# Patient Record
Sex: MALE | Race: WHITE
[De-identification: names, ages, dates, MRNs, and addresses within clinical notes are randomized per-mention and may not be internally consistent; named-entity substitution may affect disease eponyms.]

---

## 2017-01-26 ENCOUNTER — HOSPITAL ENCOUNTER (OUTPATIENT)
Dept: HOSPITAL 35 - CV | Age: 82
End: 2017-01-26
Attending: INTERNAL MEDICINE
Payer: COMMERCIAL

## 2017-01-26 DIAGNOSIS — E11.9: ICD-10-CM

## 2017-01-26 DIAGNOSIS — I25.10: Primary | ICD-10-CM

## 2017-01-26 DIAGNOSIS — E78.5: ICD-10-CM

## 2017-01-26 DIAGNOSIS — I10: ICD-10-CM

## 2018-01-25 ENCOUNTER — HOSPITAL ENCOUNTER (OUTPATIENT)
Dept: HOSPITAL 35 - NUC | Age: 83
End: 2018-01-25
Attending: INTERNAL MEDICINE
Payer: COMMERCIAL

## 2018-01-25 DIAGNOSIS — I25.10: Primary | ICD-10-CM

## 2018-01-25 DIAGNOSIS — E11.9: ICD-10-CM

## 2018-02-05 ENCOUNTER — HOSPITAL ENCOUNTER (OUTPATIENT)
Dept: HOSPITAL 61 - PCVCCLINIC | Age: 83
Discharge: HOME | End: 2018-02-05
Attending: INTERNAL MEDICINE
Payer: MEDICARE

## 2018-02-05 DIAGNOSIS — I25.10: ICD-10-CM

## 2018-02-05 DIAGNOSIS — I10: Primary | ICD-10-CM

## 2018-02-05 PROCEDURE — 36415 COLL VENOUS BLD VENIPUNCTURE: CPT

## 2019-01-31 ENCOUNTER — HOSPITAL ENCOUNTER (OUTPATIENT)
Dept: HOSPITAL 35 - CV | Age: 84
End: 2019-01-31
Attending: INTERNAL MEDICINE
Payer: COMMERCIAL

## 2019-01-31 DIAGNOSIS — I10: ICD-10-CM

## 2019-01-31 DIAGNOSIS — I25.10: Primary | ICD-10-CM

## 2019-01-31 DIAGNOSIS — E11.9: ICD-10-CM

## 2019-01-31 DIAGNOSIS — I07.1: ICD-10-CM

## 2019-01-31 DIAGNOSIS — E78.5: ICD-10-CM

## 2019-01-31 NOTE — 2DMMODE
[facility]
[employer]
[place]  [address]
Phone:  [phone] 2 D/M-MODE ECHOCARDIOGRAM     
_______________________________________________________________________________
 
Name:            DILMA KING               Room #:                    REG CL
[facility]#:           [id number]          Account #:     [id number]  
Admission:       19         Attend Phys:   Pro Quintana MD   
Discharge:                  Date of Birth: [birth year]  
Date of Service: 19 1144               Report #:      [id number]
        [id number]
_______________________________________________________________________________
THIS REPORT FOR:   //name//                          
 
 
--------------- APPROVED REPORT --------------
 
 
Study performed:  2019 11:10:59
 
EXAM: Comprehensive 2D, Doppler, and color-flow Echocardiogram 
Patient Location: Out-Patient   
      Status:  routine
 
        BSA:         2.13
HR: 110 bpm   BP:          136/60 mmHg 
Rhythm: Atrial Fibrillation     
 
Other Information 
Study Quality: Adequate
 
Indications
CAD
Hx: DM, HTN, HLP
 
2D Dimensions
RVDd:  42.36 mm  
IVSd:  12.00 (7-11mm) LVOT Diam:  23.52 (18-24mm) 
LVDd:  42.00 mm  
PWd:  12.00 (7-11mm) Ascending Ao:  33.65 (22-36mm)
LVDs:  28.00 (25-40mm) 
Aortic Root:  36.18 mm 
 
Volumes
Left Atrial Volume (Systole) 
Single Plane 4CH:  61.85 mL Single Plane 2CH:  66.93 mL
    LA ESV Index:  33.00 mL/m2
 
Aortic Valve
AoV Peak Peter.:  1.46 m/s 
AO Peak Gr.:  8.64 mmHg  LVOT Max P.21 mmHg
    LVOT Max V:  1.02 m/s
ALEM Vmax: 3.04 cm2  
 
Mitral Valve
    MV Decel. Time:  175.27 ms
MV E Max Peter.:  1.03 m/s 
 
 
 
[facility]
[address]
[place]  [address]
Phone:  [phone]                    2 D/M-MODE ECHOCARDIOGRAM     
_______________________________________________________________________________
 
Name:            DILMA KING               Room #:                    REG CL
[facility]#:           [id number]          Account #:     [id number]  
Admission:       19         Attend Phys:   Pro Quintana MD   
Discharge:                  Date of Birth: [birth year]  
Date of Service: 19 1144               Report #:      [id number]
        [id number]
_______________________________________________________________________________
Pulmonary Valve
PV Peak Peter.:  1.14 m/s PV Peak Gr.:  5.28 mmHg
 
Tricuspid Valve
TR Peak Peter.:  2.76 m/s  RAP Estimate:  5.00 mmHg
TR Peak Gr.:  30.59 mmHg 
 
Left Ventricle
The left ventricle is normal size. There is normal LV segmental wall motion.
Mild 
concentric left ventricular hypertrophy. Left ventricular systolic function is
normal. 
LVEF is 60-65%. This study is not technically sufficient to allow evaluation of
the LV 
diastolic function due to atrial fibrillation.
 
Right Ventricle
The right ventricle is normal size. The right ventricular systolic function is 
normal.
 
Atria
Left atrium is at the upper limits of normal. The right atrium size is normal.
 
Aortic Valve
The aortic valve is normal in structure. No aortic regurgitation is present.
There is no 
aortic valvular stenosis.
 
Mitral Valve
The mitral valve is normal in structure. Trace mitral regurgitation.
 
Tricuspid Valve
The tricuspid valve is normal in structure. Mild to moderate tricuspid
regurgitation. 
Estimated PAP of 35-40mmHg.
 
Pulmonic Valve
The pulmonary valve is normal in structure. Trace pulmonic regurgitation.
 
Great Vessels
The aortic root is normal in size. The ascending aorta is normal in size. IVC is
normal 
in size and collapses >50% with inspiration.
 
Pericardium
There is no pericardial effusion.
 
 
[facility]
[address]
[place]  [address]
Phone:  [phone]                    2 D/M-MODE ECHOCARDIOGRAM     
_______________________________________________________________________________
 
Name:            DILMA KING               [id number]          Account #:     [id number]  
Admission:       19         Attend Phys:   Pro Quintana MD   
Discharge:                  Date of Birth: [birth year]  
Date of Service: 19 1144               Report #:      [id number]
        [id number]
_______________________________________________________________________________
 
<Conclusion>
The left ventricle is normal size.
Mild concentric left ventricular hypertrophy.
Left ventricular systolic function is normal.
The right ventricle is normal size.
Left atrium is at the upper limits of normal.
The aortic valve is normal in structure.
Trace mitral regurgitation.
Mild to moderate tricuspid regurgitation. Estimated PAP of 35-40mmHg.
 
 
 
 
 
 
 
 
 
 
 
 
 
 
 
 
 
 
 
 
 
 
 
 
 
 
 
 
 
 
 
 
 
 
  <ELECTRONICALLY SIGNED>
   By: Pro Quintana MD               
  19     1144
D: 19 1144                           _____________________________________
T: 19 1144                           Pro Quintana MD                 /INF

## 2019-02-19 ENCOUNTER — HOSPITAL ENCOUNTER (OUTPATIENT)
Dept: HOSPITAL 61 - PCVCCLINIC | Age: 84
Discharge: HOME | End: 2019-02-19
Attending: INTERNAL MEDICINE
Payer: MEDICARE

## 2019-02-19 DIAGNOSIS — Z79.4: ICD-10-CM

## 2019-02-19 DIAGNOSIS — E78.00: ICD-10-CM

## 2019-02-19 DIAGNOSIS — Z79.82: ICD-10-CM

## 2019-02-19 DIAGNOSIS — I10: ICD-10-CM

## 2019-02-19 DIAGNOSIS — I25.10: Primary | ICD-10-CM

## 2019-02-19 DIAGNOSIS — E11.9: ICD-10-CM

## 2019-02-19 PROCEDURE — 93005 ELECTROCARDIOGRAM TRACING: CPT

## 2019-02-19 PROCEDURE — G0463 HOSPITAL OUTPT CLINIC VISIT: HCPCS

## 2019-08-30 ENCOUNTER — HOSPITAL ENCOUNTER (OUTPATIENT)
Dept: HOSPITAL 61 - PCVCIMAG | Age: 84
Discharge: HOME | End: 2019-08-30
Attending: INTERNAL MEDICINE
Payer: MEDICARE

## 2019-08-30 DIAGNOSIS — E78.00: ICD-10-CM

## 2019-08-30 DIAGNOSIS — I08.1: Primary | ICD-10-CM

## 2019-08-30 DIAGNOSIS — Z79.82: ICD-10-CM

## 2019-08-30 DIAGNOSIS — I10: ICD-10-CM

## 2019-08-30 DIAGNOSIS — I25.10: ICD-10-CM

## 2019-08-30 DIAGNOSIS — E11.9: ICD-10-CM

## 2019-08-30 DIAGNOSIS — N28.9: ICD-10-CM

## 2019-08-30 PROCEDURE — 93005 ELECTROCARDIOGRAM TRACING: CPT

## 2019-08-30 PROCEDURE — G0463 HOSPITAL OUTPT CLINIC VISIT: HCPCS

## 2019-08-30 PROCEDURE — 93306 TTE W/DOPPLER COMPLETE: CPT

## 2019-08-30 NOTE — PCVCIMAG
--------------- APPROVED REPORT --------------





Study performed:  2019 09:32:17



EXAM: Comprehensive 2D, Doppler, and color-flow 

Echocardiogram

Patient Location: Echo lab

Status:  routine



BSA:         2.13

HR: 65 bpmBP:          144/76 mmHg

Rhythm: NSR, RBBB



Other Information 

Study Quality: Adequate



Risk Factors: 

Cardiac Risk Factors:  HTN, DM



Indications

CAD

RBBB



2D Dimensions

IVSd:  12.48 (7-11mm)

LVDd:  37.60 mm

PWd:  11.86 (7-11mm)Ascending Ao:  36.12 (22-36mm)

LVDs:  27.42 (25-40mm)

Left Atrium:  47.95 (27-40mm)

Aortic Root:  31.08 mm

LV Single Plane 4CH:  62.82 %

LV Single Plane 2CH:  59.84 %

Biplane EF:  62.3 %



Volumes

Left Atrial Volume (Systole)

Single Plane 4CH:  91.06 mLSingle Plane 2CH:  85.85 mL

LA ESV Index:  43.00 mL/m2



Aortic Valve

AoV Peak Manny.:  1.37 m/s

AO Peak Gr.:  7.52 mmHgLVOT Max P.24 mmHg

LVOT Max V:  1.03 m/s



Mitral Valve

E/A Ratio:  0.8

MV Decel. Time:  300.49 ms

MV E Max Manny.:  0.71 m/s

MV A Manny.:  0.85 m/s

IVRT:  107.27 ms



Pulmonary Valve

PV Peak Manny.:  0.82 m/sPV Peak Gr.:  2.70 mmHg



Pulmonary Vein

P Vein S:    0.31 m/sP Vein A:  0.35 m/s

P Vein D:   0.43 m/sP Vein A Dur.:  128.0 msec

P Vein S/D Ratio:  0.72



Tricuspid Valve

TR Peak Manny.:  3.30 m/s

TR Peak Gr.:  43.50 mmHg



Left Ventricle

The left ventricle is normal size. There is normal LV segmental wall 

motion. Mild concentric left ventricular hypertrophy. Left 

ventricular systolic function is normal. The left ventricular 

ejection fraction is within the normal range. LVEF is 60%. Grade I - 

abnormal relaxation pattern.



Right Ventricle

The right ventricle is normal size. The right ventricular systolic 

function is normal.



Atria

Left atrium is moderately dilated. The right atrium size is 

normal.



Aortic Valve

The aortic valve is normal in structure. No aortic regurgitation is 

present. There is no aortic valvular stenosis.



Mitral Valve

The mitral valve is normal in structure. Mild mitral regurgitation. 

No evidence of mitral valve stenosis.



Tricuspid Valve

The tricuspid valve is normal in structure. Mild tricuspid 

regurgitation with PAP of 45 mmHg.



Pulmonic Valve

The pulmonary valve is normal in structure. There is trace pulmonic 

valvular regurgitation.



Great Vessels

The aortic root is normal in size. IVC is normal in size and 

collapses >50% with inspiration.



Pericardium

There is no pericardial effusion. There is no pleural 

effusion.



<Conclusion>

The left ventricle is normal size.

Mild concentric left ventricular hypertrophy.

Left ventricular systolic function is normal.

Grade I - abnormal relaxation pattern.

The right ventricle is normal size.

Left atrium is moderately dilated.

The aortic valve is normal in structure.

Mild mitral regurgitation.

Mild tricuspid regurgitation with PAP of 45 mmHg.

## 2020-02-28 ENCOUNTER — HOSPITAL ENCOUNTER (OUTPATIENT)
Dept: HOSPITAL 35 - SJCVCIMAG | Age: 85
End: 2020-02-28
Attending: INTERNAL MEDICINE
Payer: COMMERCIAL

## 2020-02-28 DIAGNOSIS — I10: ICD-10-CM

## 2020-02-28 DIAGNOSIS — I44.7: Primary | ICD-10-CM

## 2020-02-28 DIAGNOSIS — E78.00: ICD-10-CM

## 2020-02-28 DIAGNOSIS — I25.10: ICD-10-CM

## 2020-02-28 DIAGNOSIS — Z79.82: ICD-10-CM

## 2020-02-28 DIAGNOSIS — R94.31: ICD-10-CM

## 2020-02-28 DIAGNOSIS — Z79.899: ICD-10-CM

## 2020-02-28 DIAGNOSIS — Z88.5: ICD-10-CM

## 2020-08-28 ENCOUNTER — HOSPITAL ENCOUNTER (OUTPATIENT)
Dept: HOSPITAL 35 - SJCVC | Age: 85
End: 2020-08-28
Attending: INTERNAL MEDICINE
Payer: COMMERCIAL

## 2020-08-28 DIAGNOSIS — I10: ICD-10-CM

## 2020-08-28 DIAGNOSIS — E78.00: ICD-10-CM

## 2020-08-28 DIAGNOSIS — I49.49: ICD-10-CM

## 2020-08-28 DIAGNOSIS — I45.2: ICD-10-CM

## 2020-08-28 DIAGNOSIS — I25.10: Primary | ICD-10-CM

## 2020-08-28 DIAGNOSIS — Z79.899: ICD-10-CM

## 2020-08-28 DIAGNOSIS — R94.31: ICD-10-CM

## 2021-02-24 ENCOUNTER — HOSPITAL ENCOUNTER (OUTPATIENT)
Dept: HOSPITAL 35 - SJCVCIMAG | Age: 86
End: 2021-02-24
Attending: INTERNAL MEDICINE
Payer: COMMERCIAL

## 2021-02-24 DIAGNOSIS — E78.00: ICD-10-CM

## 2021-02-24 DIAGNOSIS — Z85.46: ICD-10-CM

## 2021-02-24 DIAGNOSIS — I07.1: ICD-10-CM

## 2021-02-24 DIAGNOSIS — Z88.5: ICD-10-CM

## 2021-02-24 DIAGNOSIS — Z79.82: ICD-10-CM

## 2021-02-24 DIAGNOSIS — Z79.899: ICD-10-CM

## 2021-02-24 DIAGNOSIS — M19.90: ICD-10-CM

## 2021-02-24 DIAGNOSIS — E11.9: ICD-10-CM

## 2021-02-24 DIAGNOSIS — I25.10: ICD-10-CM

## 2021-02-24 DIAGNOSIS — I45.2: ICD-10-CM

## 2021-02-24 DIAGNOSIS — R94.31: Primary | ICD-10-CM

## 2021-02-24 DIAGNOSIS — I11.9: ICD-10-CM

## 2021-08-25 ENCOUNTER — HOSPITAL ENCOUNTER (OUTPATIENT)
Dept: HOSPITAL 35 - SJCVC | Age: 86
End: 2021-08-25
Attending: INTERNAL MEDICINE
Payer: COMMERCIAL

## 2021-08-25 DIAGNOSIS — Z82.49: ICD-10-CM

## 2021-08-25 DIAGNOSIS — R00.1: ICD-10-CM

## 2021-08-25 DIAGNOSIS — Z79.82: ICD-10-CM

## 2021-08-25 DIAGNOSIS — E11.9: ICD-10-CM

## 2021-08-25 DIAGNOSIS — I45.2: ICD-10-CM

## 2021-08-25 DIAGNOSIS — R60.9: ICD-10-CM

## 2021-08-25 DIAGNOSIS — Z88.8: ICD-10-CM

## 2021-08-25 DIAGNOSIS — M19.90: ICD-10-CM

## 2021-08-25 DIAGNOSIS — I10: ICD-10-CM

## 2021-08-25 DIAGNOSIS — I25.10: ICD-10-CM

## 2021-08-25 DIAGNOSIS — R94.31: Primary | ICD-10-CM

## 2021-08-25 DIAGNOSIS — Z79.899: ICD-10-CM

## 2021-08-25 DIAGNOSIS — E78.00: ICD-10-CM

## 2022-02-23 ENCOUNTER — HOSPITAL ENCOUNTER (OUTPATIENT)
Dept: HOSPITAL 35 - SJCVCIMAG | Age: 87
End: 2022-02-23
Attending: INTERNAL MEDICINE
Payer: COMMERCIAL

## 2022-02-23 DIAGNOSIS — M19.90: ICD-10-CM

## 2022-02-23 DIAGNOSIS — E11.621: ICD-10-CM

## 2022-02-23 DIAGNOSIS — Z82.49: ICD-10-CM

## 2022-02-23 DIAGNOSIS — I45.10: ICD-10-CM

## 2022-02-23 DIAGNOSIS — Z88.5: ICD-10-CM

## 2022-02-23 DIAGNOSIS — I73.9: ICD-10-CM

## 2022-02-23 DIAGNOSIS — Z79.82: ICD-10-CM

## 2022-02-23 DIAGNOSIS — Z79.4: ICD-10-CM

## 2022-02-23 DIAGNOSIS — I25.9: Primary | ICD-10-CM

## 2022-02-23 DIAGNOSIS — E78.00: ICD-10-CM

## 2022-02-23 DIAGNOSIS — C61: ICD-10-CM

## 2022-02-23 DIAGNOSIS — I10: ICD-10-CM

## 2022-02-23 DIAGNOSIS — Z90.49: ICD-10-CM

## 2022-02-23 DIAGNOSIS — I45.2: ICD-10-CM

## 2022-02-23 DIAGNOSIS — L97.311: ICD-10-CM

## 2022-02-23 DIAGNOSIS — I25.10: ICD-10-CM

## 2022-02-23 DIAGNOSIS — I49.3: ICD-10-CM

## 2022-02-23 DIAGNOSIS — Z79.899: ICD-10-CM
